# Patient Record
Sex: MALE | Race: BLACK OR AFRICAN AMERICAN | NOT HISPANIC OR LATINO | URBAN - METROPOLITAN AREA
[De-identification: names, ages, dates, MRNs, and addresses within clinical notes are randomized per-mention and may not be internally consistent; named-entity substitution may affect disease eponyms.]

---

## 2018-05-19 ENCOUNTER — EMERGENCY (EMERGENCY)
Facility: HOSPITAL | Age: 43
LOS: 1 days | Discharge: HOME | End: 2018-05-19
Attending: EMERGENCY MEDICINE
Payer: COMMERCIAL

## 2018-05-19 VITALS
HEART RATE: 78 BPM | DIASTOLIC BLOOD PRESSURE: 86 MMHG | SYSTOLIC BLOOD PRESSURE: 128 MMHG | TEMPERATURE: 98 F | OXYGEN SATURATION: 98 % | WEIGHT: 190.04 LBS | RESPIRATION RATE: 19 BRPM | HEIGHT: 68 IN

## 2018-05-19 PROBLEM — Z00.00 ENCOUNTER FOR PREVENTIVE HEALTH EXAMINATION: Status: ACTIVE | Noted: 2018-05-19

## 2018-05-19 PROCEDURE — 96372 THER/PROPH/DIAG INJ SC/IM: CPT

## 2018-05-19 PROCEDURE — 99283 EMERGENCY DEPT VISIT LOW MDM: CPT | Mod: 25

## 2018-05-19 RX ORDER — IBUPROFEN 200 MG
1 TABLET ORAL
Qty: 20 | Refills: 0 | OUTPATIENT
Start: 2018-05-19 | End: 2018-05-23

## 2018-05-19 RX ORDER — METHOCARBAMOL 500 MG/1
750 TABLET, FILM COATED ORAL ONCE
Qty: 0 | Refills: 0 | Status: COMPLETED | OUTPATIENT
Start: 2018-05-19 | End: 2018-05-19

## 2018-05-19 RX ORDER — KETOROLAC TROMETHAMINE 30 MG/ML
30 SYRINGE (ML) INJECTION ONCE
Qty: 0 | Refills: 0 | Status: DISCONTINUED | OUTPATIENT
Start: 2018-05-19 | End: 2018-05-19

## 2018-05-19 RX ORDER — METHOCARBAMOL 500 MG/1
2 TABLET, FILM COATED ORAL
Qty: 28 | Refills: 0 | OUTPATIENT
Start: 2018-05-19 | End: 2018-05-23

## 2018-05-19 RX ADMIN — METHOCARBAMOL 750 MILLIGRAM(S): 500 TABLET, FILM COATED ORAL at 02:36

## 2018-05-19 RX ADMIN — Medication 30 MILLIGRAM(S): at 02:35

## 2018-05-19 NOTE — ED PROVIDER NOTE - PROGRESS NOTE DETAILS
PT states pain is "greatly improved." PT reevaluated, FROM of neck, slight tenderness along right upper trapezius. rx motrin/muscle relaxer to pharmacy. PT verbalized understanding of diagnosis and importance of follow up at [facility]. PT educated on importance of follow up and when to return to the ED.

## 2018-05-19 NOTE — ED PROVIDER NOTE - PHYSICAL EXAMINATION
General: PT sleeping comfortably on stretcher, easily awakened, no acute distress, appears well.  Heart: s1s2 regular rate   Chest: CTA b/l  MS: + tenderness over right upper trapezius, no midline vertebral tenderness, LROM of neck to right/left 2nd to pain, FROM b/l LE/UE  Neuro: steady ambulation with out assist, 5 + strength b/l UE/LE, sensory grossly intact

## 2018-05-19 NOTE — ED PROVIDER NOTE - CHPI ED SYMPTOMS NEG
no fatigue/no difficulty bearing weight/no motor function loss/no anorexia/no bowel dysfunction/no tingling/no bladder dysfunction/no numbness/no constipation

## 2018-05-19 NOTE — ED PROVIDER NOTE - ATTENDING CONTRIBUTION TO CARE
presents with neck pain, no swelling, noted, skin chnages, plan pain control, distal sensory motor exam, close follow up with pmd, and pt

## 2018-05-19 NOTE — ED PROVIDER NOTE - OBJECTIVE STATEMENT
44 yo M PT, no pertinent PmHX, presents to ED complaining of right sided neck pain x 2 days. PT states he woke up on Wednesday and started experiencing a aching right sided neck pain, with associated pain on ROM. no OTC medication for alleviation of symptoms. PT denies fever, chills, HA, visual changes, N/V/D, recent trauma, numbness, weakness, and tingling.

## 2022-10-01 ENCOUNTER — EMERGENCY (EMERGENCY)
Facility: HOSPITAL | Age: 47
LOS: 1 days | Discharge: HOME | End: 2022-10-01
Attending: EMERGENCY MEDICINE
Payer: MEDICAID

## 2022-10-01 VITALS
SYSTOLIC BLOOD PRESSURE: 142 MMHG | TEMPERATURE: 98 F | OXYGEN SATURATION: 97 % | HEIGHT: 68 IN | HEART RATE: 75 BPM | DIASTOLIC BLOOD PRESSURE: 94 MMHG | RESPIRATION RATE: 20 BRPM

## 2022-10-01 PROCEDURE — 73030 X-RAY EXAM OF SHOULDER: CPT | Mod: 26,RT

## 2022-10-01 PROCEDURE — 73030 X-RAY EXAM OF SHOULDER: CPT

## 2022-10-01 PROCEDURE — 99284 EMERGENCY DEPT VISIT MOD MDM: CPT

## 2022-10-01 PROCEDURE — 99283 EMERGENCY DEPT VISIT LOW MDM: CPT

## 2022-10-01 PROCEDURE — 96372 THER/PROPH/DIAG INJ SC/IM: CPT

## 2022-10-01 RX ORDER — LIDOCAINE 4 G/100G
1 CREAM TOPICAL ONCE
Refills: 0 | Status: COMPLETED | OUTPATIENT
Start: 2022-10-01 | End: 2022-10-01

## 2022-10-01 RX ORDER — DIAZEPAM 5 MG
2 TABLET ORAL ONCE
Refills: 0 | Status: DISCONTINUED | OUTPATIENT
Start: 2022-10-01 | End: 2022-10-01

## 2022-10-01 RX ORDER — KETOROLAC TROMETHAMINE 30 MG/ML
30 SYRINGE (ML) INJECTION ONCE
Refills: 0 | Status: DISCONTINUED | OUTPATIENT
Start: 2022-10-01 | End: 2022-10-01

## 2022-10-01 RX ADMIN — Medication 2 MILLIGRAM(S): at 02:25

## 2022-10-01 RX ADMIN — Medication 30 MILLIGRAM(S): at 02:26

## 2022-10-01 RX ADMIN — LIDOCAINE 1 PATCH: 4 CREAM TOPICAL at 02:26

## 2022-10-01 NOTE — ED PROVIDER NOTE - NS ED ATTENDING STATEMENT MOD
This was a shared visit with the ZAIDA. I reviewed and verified the documentation and independently performed the documented:

## 2022-10-01 NOTE — ED PROVIDER NOTE - PATIENT PORTAL LINK FT
You can access the FollowMyHealth Patient Portal offered by [facility] by registering at the following website: [email or web address]. By joining [employer]’s FollowMyHealth portal, you will also be able to view your health information using other applications (apps) compatible with our system.

## 2022-10-01 NOTE — ED PROVIDER NOTE - CLINICAL SUMMARY MEDICAL DECISION MAKING FREE TEXT BOX
47 year old male with no med hx presented to ED c/o righty shoulder pain x 2 weeks. meds, xray reassess

## 2022-10-01 NOTE — ED PROVIDER NOTE - NSFOLLOWUPINSTRUCTIONS_ED_ALL_ED_FT
Follow up with Orthopedics within 1 week   Follow up with PCP within 1 week   Take tylenol every 6 hours for pain   Take robaxin every 8 hours   Apply lidocaine patch or Salon pas every 12 hours     Return if new or worsening symptoms     Strain    A strain is a stretch or tear in one of the muscles in your body. This is caused by an injury to the area such as a twisting mechanism. Symptoms include pain, swelling, or bruising. Rest that area over the next several days and slowly resume activity when tolerated. Ice can help with swelling and pain.     SEEK IMMEDIATE MEDICAL CARE IF YOU HAVE ANY OF THE FOLLOWING SYMPTOMS: worsening pain, inability to move that body part, numbness or tingling.

## 2022-10-01 NOTE — ED ADULT NURSE NOTE - OBJECTIVE STATEMENT
to ED c/o R shoulder pain x 2 weeks. denies heavy lifting. denies trauma. patient states "I used to lift heavy things but I don't anymore".

## 2022-10-01 NOTE — ED ADULT TRIAGE NOTE - NS ED TRIAGE AVPU SCALE
Alert-The patient is alert, awake and responds to voice. The patient is oriented to time, place, and person. The triage nurse is able to obtain subjective information.
Self

## 2022-10-01 NOTE — ED PROVIDER NOTE - NSFOLLOWUPCLINICS_GEN_ALL_ED_FT
[facility]  Orthopedics  [address] [place] [id number]  Phone: [phone]  Fax:

## 2022-10-01 NOTE — ED PROVIDER NOTE - ATTENDING APP SHARED VISIT CONTRIBUTION OF CARE
48 yo M presents to ED c/o R shoulder pain x last 2 weeks.  No hx of injury or trauma.  Pt drives a car for living.  no relief with OTC meds.  On exam awake and alert in no acute distress, R shoulder with no obvious deformity, + pain on AROM, NVI, + distal pulses.  will check x-rays and re-eval after meds

## 2022-10-01 NOTE — ED ADULT TRIAGE NOTE - CHIEF COMPLAINT QUOTE
Pt presenting with reports of right shoulder and right rib pain x3 weeks, denies injury to the area. PMH DM. Pt denies chest pain/SOB/N/V/D.

## 2022-10-01 NOTE — ED PROVIDER NOTE - OBJECTIVE STATEMENT
47 year old male with no med hx presented to ED c/o righty shoulder pain x 2 weeks. states he has been taking advil with little relief.n pain made worse with movement, described as burning sensation from left neck to left shoulder and back.  he works as [employer] . denies injury/trauma/weakness. denies rash. denies fever/chills and decrease in ROM

## 2022-12-21 ENCOUNTER — EMERGENCY (EMERGENCY)
Facility: HOSPITAL | Age: 47
LOS: 1 days | Discharge: HOME | End: 2022-12-21
Attending: EMERGENCY MEDICINE
Payer: COMMERCIAL

## 2022-12-21 VITALS
RESPIRATION RATE: 16 BRPM | DIASTOLIC BLOOD PRESSURE: 71 MMHG | TEMPERATURE: 99 F | OXYGEN SATURATION: 99 % | WEIGHT: 198.2 LBS | HEIGHT: 67 IN | HEART RATE: 94 BPM | SYSTOLIC BLOOD PRESSURE: 113 MMHG

## 2022-12-21 LAB
FLUAV AG NPH QL: SIGNIFICANT CHANGE UP
FLUBV AG NPH QL: SIGNIFICANT CHANGE UP
RSV RNA NPH QL NAA+NON-PROBE: SIGNIFICANT CHANGE UP
SARS-COV-2 RNA SPEC QL NAA+PROBE: SIGNIFICANT CHANGE UP

## 2022-12-21 PROCEDURE — 87637 SARSCOV2&INF A&B&RSV AMP PRB: CPT

## 2022-12-21 PROCEDURE — 99283 EMERGENCY DEPT VISIT LOW MDM: CPT

## 2022-12-21 PROCEDURE — 99284 EMERGENCY DEPT VISIT MOD MDM: CPT

## 2022-12-21 RX ORDER — IBUPROFEN 200 MG
600 TABLET ORAL ONCE
Refills: 0 | Status: COMPLETED | OUTPATIENT
Start: 2022-12-21 | End: 2022-12-21

## 2022-12-21 RX ADMIN — Medication 600 MILLIGRAM(S): at 15:52

## 2022-12-21 NOTE — ED PROVIDER NOTE - ATTENDING APP SHARED VISIT CONTRIBUTION OF CARE
46yo M presents with cough, nasal congestion, and tactile fever x2d. pt also reports diarrhea, body aches and sore throat x1d. denies nausea, vomiting, CP, SOB, HA, sick contacts symptoms c/w viral syndrome. will treat symptoms, swab, dc with outpatient follow up

## 2022-12-21 NOTE — ED PROVIDER NOTE - PATIENT PORTAL LINK FT
You can access the FollowMyHealth Patient Portal offered by [facility] by registering at the following website: [email or web address]. By joining [employer]’s FollowMyHealth portal, you will also be able to view your health information using other applications (apps) compatible with our system.

## 2022-12-21 NOTE — ED PROVIDER NOTE - CLINICAL SUMMARY MEDICAL DECISION MAKING FREE TEXT BOX
48yo M presents with flu-like sx. Pt received RVP and Tylenol. discussed supportive care measures. advised pt to f/u with PCP

## 2022-12-21 NOTE — ED PROVIDER NOTE - OBJECTIVE STATEMENT
46yo M presents with cough, nasal congestion, and tactile fever x2d. pt also reports diarrhea, body aches and sore throat x1d. denies nausea, vomiting, CP, SOB, HA, sick contacts

## 2023-09-08 ENCOUNTER — OFFICE (OUTPATIENT)
Dept: URBAN - METROPOLITAN AREA CLINIC 116 | Facility: CLINIC | Age: 48
Setting detail: OPHTHALMOLOGY
End: 2023-09-08
Payer: COMMERCIAL

## 2023-09-08 DIAGNOSIS — H16.223: ICD-10-CM

## 2023-09-08 DIAGNOSIS — E11.9: ICD-10-CM

## 2023-09-08 DIAGNOSIS — H11.153: ICD-10-CM

## 2023-09-08 DIAGNOSIS — E11.3293: ICD-10-CM

## 2023-09-08 DIAGNOSIS — H25.13: ICD-10-CM

## 2023-09-08 DIAGNOSIS — H16.222: ICD-10-CM

## 2023-09-08 DIAGNOSIS — H16.221: ICD-10-CM

## 2023-09-08 PROCEDURE — 92014 COMPRE OPH EXAM EST PT 1/>: CPT | Performed by: OPTOMETRIST

## 2023-09-08 PROCEDURE — 92250 FUNDUS PHOTOGRAPHY W/I&R: CPT | Performed by: OPTOMETRIST

## 2023-09-08 ASSESSMENT — KERATOMETRY
OS_K1POWER_DIOPTERS: 43.50
OD_AXISANGLE_DEGREES: 090
OS_K2POWER_DIOPTERS: 43.75
OS_AXISANGLE_DEGREES: 155
OD_K1POWER_DIOPTERS: 43.75
OD_K2POWER_DIOPTERS: 44.00

## 2023-09-08 ASSESSMENT — REFRACTION_MANIFEST
OD_ADD: +1.50
OS_CYLINDER: -0.25
OD_VA2: 20/20
OU_VA: 20/20
OS_CYLINDER: -0.50
OS_SPHERE: +2.25
OS_AXIS: 085
OD_VA1: 20/20
OS_CYLINDER: SPH
OD_SPHERE: +2.00
OD_VA2: 20/20
OS_SPHERE: +2.00
OS_VA1: 20/20
OS_SPHERE: +2.25
OS_VA2: 20/20
OD_VA1: 20/20
OD_ADD: +1.50
OS_VA1: 20/20
OD_SPHERE: +1.75
OS_ADD: +1.75
OU_VA: 20/20
OD_AXIS: 90
OS_AXIS: 85
OS_ADD: +1.50
OD_VA1: 20/20
OD_CYLINDER: -0.25
OD_CYLINDER: SPH
OD_CYLINDER: -0.50
OD_SPHERE: +1.75
OD_ADD: +1.75
OS_VA1: 20/20
OS_VA2: 20/20
OS_ADD: +1.50
OD_AXIS: 090

## 2023-09-08 ASSESSMENT — SPHEQUIV_DERIVED
OS_SPHEQUIV: 1.875
OS_SPHEQUIV: 2
OD_SPHEQUIV: 1.75
OD_SPHEQUIV: 1.625
OD_SPHEQUIV: 1.625
OS_SPHEQUIV: 2

## 2023-09-08 ASSESSMENT — AXIALLENGTH_DERIVED
OD_AL: 22.8454
OS_AL: 22.8402
OS_AL: 22.7947
OS_AL: 22.7947
OD_AL: 22.7999
OD_AL: 22.8454

## 2023-09-08 ASSESSMENT — REFRACTION_CURRENTRX
OD_CYLINDER: 0.00
OS_VPRISM_DIRECTION: PROGS
OS_OVR_VA: 20/
OS_AXIS: 000
OD_SPHERE: +1.75
OS_OVR_VA: 20/
OD_SPHERE: +2.00
OD_ADD: +1.25
OD_OVR_VA: 20/
OS_CYLINDER: 0.00
OD_CYLINDER: -0.25
OD_ADD: +1.50
OS_SPHERE: +1.75
OS_VPRISM_DIRECTION: PROGS
OS_OVR_VA: 20/
OD_OVR_VA: 20/
OD_VPRISM_DIRECTION: PROGS
OS_ADD: +1.50
OS_SPHERE: +2.25
OD_ADD: +1.50
OS_ADD: +1.50
OD_AXIS: 000
OS_ADD: +1.25
OD_OVR_VA: 20/
OS_CYLINDER: -0.25
OS_VPRISM_DIRECTION: PROGS
OS_AXIS: 154
OD_VPRISM_DIRECTION: PROGS
OD_AXIS: 145
OD_AXIS: 180
OD_CYLINDER: -0.25
OS_SPHERE: +2.00
OD_SPHERE: +2.00
OD_VPRISM_DIRECTION: PROGS

## 2023-09-08 ASSESSMENT — REFRACTION_AUTOREFRACTION
OS_CYLINDER: -0.50
OD_SPHERE: +1.75
OD_CYLINDER: -0.25
OS_AXIS: 090
OD_AXIS: 080
OS_SPHERE: +2.25

## 2023-09-08 ASSESSMENT — SUPERFICIAL PUNCTATE KERATITIS (SPK)
OS_SPK: T
OD_SPK: T

## 2023-09-08 ASSESSMENT — VISUAL ACUITY
OS_BCVA: 20/30
OD_BCVA: 20/25

## 2023-09-08 ASSESSMENT — CONFRONTATIONAL VISUAL FIELD TEST (CVF)
OD_FINDINGS: FULL
OS_FINDINGS: FULL

## 2023-09-08 ASSESSMENT — TONOMETRY
OD_IOP_MMHG: 19
OS_IOP_MMHG: 17

## 2025-05-27 ENCOUNTER — OFFICE (OUTPATIENT)
Dept: URBAN - METROPOLITAN AREA CLINIC 94 | Facility: CLINIC | Age: 50
Setting detail: OPHTHALMOLOGY
End: 2025-05-27
Payer: COMMERCIAL

## 2025-05-27 DIAGNOSIS — H16.222: ICD-10-CM

## 2025-05-27 DIAGNOSIS — H25.13: ICD-10-CM

## 2025-05-27 PROCEDURE — 92250 FUNDUS PHOTOGRAPHY W/I&R: CPT | Performed by: OPTOMETRIST

## 2025-05-27 PROCEDURE — 92014 COMPRE OPH EXAM EST PT 1/>: CPT | Performed by: OPTOMETRIST

## 2025-05-27 ASSESSMENT — KERATOMETRY
OD_AXISANGLE_DEGREES: 090
OS_AXISANGLE_DEGREES: 074
OD_K1POWER_DIOPTERS: 44.00
OS_K1POWER_DIOPTERS: 43.25
OD_K2POWER_DIOPTERS: 44.00
OS_K2POWER_DIOPTERS: 44.00

## 2025-05-27 ASSESSMENT — REFRACTION_CURRENTRX
OD_SPHERE: +2.00
OS_VPRISM_DIRECTION: PROGS
OS_VPRISM_DIRECTION: PROGS
OD_VPRISM_DIRECTION: PROGS
OD_SPHERE: +1.75
OD_CYLINDER: SPH
OS_ADD: +1.50
OS_OVR_VA: 20/
OS_SPHERE: +2.00
OD_OVR_VA: 20/
OS_SPHERE: +1.75
OS_ADD: +1.25
OS_OVR_VA: 20/
OS_AXIS: 000
OS_AXIS: 154
OD_ADD: +1.50
OD_OVR_VA: 20/
OS_SPHERE: +2.00
OD_CYLINDER: -0.25
OD_SPHERE: +1.75
OD_VPRISM_DIRECTION: PROGS
OD_AXIS: 145
OS_OVR_VA: 20/
OD_AXIS: 000
OD_VPRISM_DIRECTION: PROGS
OS_VPRISM_DIRECTION: PROGS
OS_CYLINDER: 0.00
OD_ADD: +1.50
OD_CYLINDER: 0.00
OS_CYLINDER: SPH
OD_AXIS: 180
OD_ADD: +1.25
OD_CYLINDER: -0.25
OD_OVR_VA: 20/
OS_SPHERE: +2.25
OS_CYLINDER: -0.25
OD_SPHERE: +2.00
OS_ADD: +1.50

## 2025-05-27 ASSESSMENT — REFRACTION_MANIFEST
OD_ADD: +1.75
OS_VA2: 20/20
OU_VA: 20/20
OD_VA1: 20/20
OS_VA1: 20/20
OS_CYLINDER: -0.25
OU_VA: 20/20
OD_AXIS: 90
OS_ADD: +1.50
OD_ADD: +1.50
OS_VA1: 20/20
OS_VA2: 20/20
OS_CYLINDER: SPH
OD_SPHERE: +2.00
OS_ADD: +2.25
OD_VA2: 20/20
OS_SPHERE: +2.25
OS_ADD: +1.50
OD_SPHERE: +1.75
OS_CYLINDER: SPH
OD_CYLINDER: -0.25
OD_AXIS: 090
OD_SPHERE: +1.75
OS_ADD: +1.75
OD_VA1: 20/20
OS_AXIS: 085
OD_CYLINDER: -0.50
OD_SPHERE: +1.75
OD_VA2: 20/20
OS_AXIS: 85
OD_VA1: 20/20
OS_SPHERE: +2.00
OS_SPHERE: +1.75
OS_CYLINDER: -0.50
OD_VA1: 20/20
OS_VA1: 20/20
OS_VA1: 20/20
OD_ADD: +1.50
OS_SPHERE: +2.25
OD_ADD: +2.25
OD_CYLINDER: SPH
OD_CYLINDER: SPH

## 2025-05-27 ASSESSMENT — REFRACTION_AUTOREFRACTION
OS_CYLINDER: SPH
OD_SPHERE: +2.00
OS_SPHERE: +2.00
OD_AXIS: 094
OD_CYLINDER: -0.25

## 2025-05-27 ASSESSMENT — SUPERFICIAL PUNCTATE KERATITIS (SPK)
OD_SPK: T
OS_SPK: T

## 2025-05-27 ASSESSMENT — TONOMETRY
OD_IOP_MMHG: 20
OS_IOP_MMHG: 20

## 2025-05-27 ASSESSMENT — CONFRONTATIONAL VISUAL FIELD TEST (CVF)
OD_FINDINGS: FULL
OS_FINDINGS: FULL

## 2025-05-27 ASSESSMENT — VISUAL ACUITY
OS_BCVA: 20/20
OD_BCVA: 20/20-1